# Patient Record
Sex: MALE | Race: WHITE | NOT HISPANIC OR LATINO | Employment: OTHER | URBAN - METROPOLITAN AREA
[De-identification: names, ages, dates, MRNs, and addresses within clinical notes are randomized per-mention and may not be internally consistent; named-entity substitution may affect disease eponyms.]

---

## 2020-02-06 ENCOUNTER — TRANSCRIBE ORDERS (OUTPATIENT)
Dept: LAB | Facility: HOSPITAL | Age: 70
End: 2020-02-06

## 2020-02-06 ENCOUNTER — TRANSCRIBE ORDERS (OUTPATIENT)
Dept: ADMINISTRATIVE | Facility: HOSPITAL | Age: 70
End: 2020-02-06

## 2020-02-06 ENCOUNTER — HOSPITAL ENCOUNTER (OUTPATIENT)
Dept: GENERAL RADIOLOGY | Facility: HOSPITAL | Age: 70
Discharge: HOME | End: 2020-02-06
Admitting: ORTHOPAEDIC SURGERY

## 2020-02-06 ENCOUNTER — APPOINTMENT (OUTPATIENT)
Dept: LAB | Facility: HOSPITAL | Age: 70
End: 2020-02-06

## 2020-02-06 DIAGNOSIS — Z87.898 PERSONAL HISTORY OF OTHER LYMPHATIC AND HEMATOPOIETIC NEOPLASM: Primary | ICD-10-CM

## 2020-02-06 DIAGNOSIS — Z01.818 OTHER SPECIFIED PRE-OPERATIVE EXAMINATION: ICD-10-CM

## 2020-02-06 DIAGNOSIS — Z01.811 PRE-OP CHEST EXAM: Primary | ICD-10-CM

## 2020-02-06 DIAGNOSIS — R73.09 IMPAIRED GLUCOSE TOLERANCE TEST: ICD-10-CM

## 2020-02-06 LAB
ANION GAP SERPL CALCULATED.3IONS-SCNC: 12.4 MMOL/L (ref 5–15)
BUN BLD-MCNC: 16 MG/DL (ref 8–23)
BUN/CREAT SERPL: 15 (ref 7–25)
CALCIUM SPEC-SCNC: 9.2 MG/DL (ref 8.6–10.5)
CHLORIDE SERPL-SCNC: 103 MMOL/L (ref 98–107)
CO2 SERPL-SCNC: 25.6 MMOL/L (ref 22–29)
CREAT BLD-MCNC: 1.07 MG/DL (ref 0.76–1.27)
DEPRECATED RDW RBC AUTO: 44.1 FL (ref 37–54)
ERYTHROCYTE [DISTWIDTH] IN BLOOD BY AUTOMATED COUNT: 13.1 % (ref 12.3–15.4)
GFR SERPL CREATININE-BSD FRML MDRD: 69 ML/MIN/1.73
GLUCOSE BLD-MCNC: 84 MG/DL (ref 65–99)
HBA1C MFR BLD: 5.76 % (ref 4.8–5.6)
HCT VFR BLD AUTO: 42.9 % (ref 37.5–51)
HGB BLD-MCNC: 14.7 G/DL (ref 13–17.7)
MCH RBC QN AUTO: 31.9 PG (ref 26.6–33)
MCHC RBC AUTO-ENTMCNC: 34.3 G/DL (ref 31.5–35.7)
MCV RBC AUTO: 93.1 FL (ref 79–97)
PLATELET # BLD AUTO: 223 10*3/MM3 (ref 140–450)
PMV BLD AUTO: 9.6 FL (ref 6–12)
POTASSIUM BLD-SCNC: 3.7 MMOL/L (ref 3.5–5.2)
RBC # BLD AUTO: 4.61 10*6/MM3 (ref 4.14–5.8)
SODIUM BLD-SCNC: 141 MMOL/L (ref 136–145)
WBC NRBC COR # BLD: 5.2 10*3/MM3 (ref 3.4–10.8)

## 2020-02-06 PROCEDURE — 93005 ELECTROCARDIOGRAM TRACING: CPT | Performed by: ORTHOPAEDIC SURGERY

## 2020-02-06 PROCEDURE — 80048 BASIC METABOLIC PNL TOTAL CA: CPT | Performed by: ORTHOPAEDIC SURGERY

## 2020-02-06 PROCEDURE — 36415 COLL VENOUS BLD VENIPUNCTURE: CPT | Performed by: ORTHOPAEDIC SURGERY

## 2020-02-06 PROCEDURE — 93010 ELECTROCARDIOGRAM REPORT: CPT | Performed by: INTERNAL MEDICINE

## 2020-02-06 PROCEDURE — 85027 COMPLETE CBC AUTOMATED: CPT | Performed by: ORTHOPAEDIC SURGERY

## 2020-02-06 PROCEDURE — 71046 X-RAY EXAM CHEST 2 VIEWS: CPT

## 2020-02-06 PROCEDURE — 83036 HEMOGLOBIN GLYCOSYLATED A1C: CPT | Performed by: ORTHOPAEDIC SURGERY

## 2020-12-18 ENCOUNTER — TRANSCRIBE ORDERS (OUTPATIENT)
Dept: ADMINISTRATIVE | Facility: HOSPITAL | Age: 70
End: 2020-12-18

## 2020-12-18 ENCOUNTER — APPOINTMENT (OUTPATIENT)
Dept: PREADMISSION TESTING | Facility: HOSPITAL | Age: 70
End: 2020-12-18

## 2020-12-18 DIAGNOSIS — R13.10 DYSPHAGIA, UNSPECIFIED TYPE: Primary | ICD-10-CM

## 2020-12-20 ENCOUNTER — APPOINTMENT (OUTPATIENT)
Dept: PREADMISSION TESTING | Facility: HOSPITAL | Age: 70
End: 2020-12-20

## 2020-12-20 LAB — SARS-COV-2 RNA RESP QL NAA+PROBE: NOT DETECTED

## 2020-12-20 PROCEDURE — U0004 COV-19 TEST NON-CDC HGH THRU: HCPCS

## 2020-12-20 PROCEDURE — C9803 HOPD COVID-19 SPEC COLLECT: HCPCS

## 2020-12-23 ENCOUNTER — HOSPITAL ENCOUNTER (OUTPATIENT)
Dept: GENERAL RADIOLOGY | Facility: HOSPITAL | Age: 70
Discharge: HOME | End: 2020-12-23
Admitting: OTOLARYNGOLOGY

## 2020-12-23 DIAGNOSIS — R13.10 DYSPHAGIA, UNSPECIFIED TYPE: ICD-10-CM

## 2020-12-23 PROCEDURE — A9270 NON-COVERED ITEM OR SERVICE: HCPCS | Performed by: OTOLARYNGOLOGY

## 2020-12-23 PROCEDURE — 74230 X-RAY XM SWLNG FUNCJ C+: CPT

## 2020-12-23 PROCEDURE — 63710000001 BARIUM SULFATE 40 % PASTE: Performed by: OTOLARYNGOLOGY

## 2020-12-23 PROCEDURE — 92611 MOTION FLUOROSCOPY/SWALLOW: CPT | Performed by: SPEECH-LANGUAGE PATHOLOGIST

## 2020-12-23 PROCEDURE — 63710000001 BARIUM SULFATE 40 % RECONSTITUTED SUSPENSION: Performed by: OTOLARYNGOLOGY

## 2020-12-23 RX ADMIN — BARIUM SULFATE 20 ML: 400 PASTE ORAL at 09:48

## 2020-12-23 RX ADMIN — BARIUM SULFATE 100 ML: 0.81 POWDER, FOR SUSPENSION ORAL at 09:48

## 2020-12-23 NOTE — MBS/VFSS/FEES
"Outpatient Speech Language Pathology   Adult Swallow Initial Evaluation  [facility]   Outpatient Modified Barium Swallow Study (MBS)       Patient Name: Shar Freitas  : 1950  MRN: [id number]  Today's Date: 2020         Visit Date: 2020   There is no problem list on file for this patient.       History reviewed. No pertinent past medical history.     No past surgical history on file.      Visit Dx:     ICD-10-CM ICD-9-CM   1. Dysphagia, unspecified type  R13.10 787.20           SLP Adult Swallow Evaluation     Row Name 20 0940       Rehab Evaluation    Document Type  evaluation  -    Subjective Information  no complaints  -CJ    Patient Observations  alert;cooperative  -CJ    Patient/Family/Caregiver Comments/Observations  no family present  -CJ    Care Plan Review  evaluation/treatment results reviewed;patient/other agree to care plan  -CJ    Patient Effort  excellent  -CJ    Symptoms Noted During/After Treatment  none  -CJ       General Information    Patient Profile Reviewed  yes  -CJ    Pertinent History Of Current Problem  Mr. Freitas is referred today by Dr. Freeman 2/2 complaints of difficulty swallowing and complaining of \"foods sticking\". He reports no recent hospitalizations, recent PNA, no significant neurological history. Pt reports he did have a heart stent placed in  at [facility]. He reports he previously took reflux medication but hasn't taken it in a while. He reports general hoarsness of vocal quality that has been \"going on for a few years\". He reports worsening dysphagia w/ solids. He denies any odynophagia.  -CJ    Current Method of Nutrition  regular textures;thin liquids  -CJ    Precautions/Limitations, Vision  WFL;for purposes of eval;WFL with corrective lenses  -CJ    Precautions/Limitations, Hearing  WFL;for purposes of eval  -CJ    Prior Level of Function-Communication  WFL  -CJ    Prior Level of Function-Swallowing  no diet consistency restrictions;safe, " efficient swallowing in all situations  -    Plans/Goals Discussed with  patient;agreed upon  -    Barriers to Rehab  none identified  -    Patient's Goals for Discharge  patient did not state  -       Pain    Additional Documentation  Pain Scale: FACES Pre/Post-Treatment (Group)  -       Pain Scale: FACES Pre/Post-Treatment    Pain: FACES Scale, Pretreatment  0-->no hurt  -CJ    Posttreatment Pain Rating  0-->no hurt  -CJ       Oral Motor Structure and Function    Dentition Assessment  natural, present and adequate  -    Secretion Management  WNL/WFL  -CJ    Mucosal Quality  moist, healthy  -    Volitional Swallow  WFL  -CJ    Volitional Cough  WFL  -CJ       Oral Musculature and Cranial Nerve Assessment    Oral Motor General Assessment  WFL  -CJ       General Eating/Swallowing Observations    Respiratory Support Currently in Use  room air  -    Eating/Swallowing Skills  self-fed  -    Positioning During Eating  upright 90 degree;upright in chair  -CJ    Utensils Used  spoon;straw;cup  -CJ       MBS/VFSS    Utensils Used  spoon;cup;straw  -    Consistencies Trialed  regular textures;pureed;thin liquids  -       MBS/VFSS Interpretation    Oral Prep Phase  WFL  -    Oral Transit Phase  WFL  -    Oral Residue  WFL  -    VFSS Summary  MBS completed this am. Mr. Freitas is positioned upright and centered in vess chair to accept po presentations of solid cracker, puree consistency and thin liquids via spoon cup and straw. His oral phase is wfl. Intermittent premature spillage w/ large consecutive sips of thin liquids via straw to the bilateral pyriforms. No laryngeal penetration or aspiration observed across this evaluation. No significant oral or pharyngeal residue evidenced across this evaluation. Noted mucosal abnormality at the level of C6 concerning for osteophyte vs CP however doese not impact function or safety of swallowing. Per this evaluation Mr. Freitas presents w/ wfl oropharyngeal  swallowing fnx. Educated Mr. Freitas on alternating liquids/solids, small bites/sips to reduce globus sensation. He verbalized agreement and understanding. He is felt to most benefit from continued regular diet consistency w/ thin liquids. Meds whole in puree/thins as tolerated. Upright and centered for all po intake. Small bites/sips. Alternate liquids/solids. No further SLP f/u warranted at this time.   -CJ       Initiation of Pharyngeal Swallow    Initiation of Pharyngeal Swallow  bolus in pyriform sinuses  -    Pharyngeal Phase  functional pharyngeal phase of swallowing  -CJ       Clinical Impression    SLP Swallowing Diagnosis  functional oral phase;functional pharyngeal phase  -    Functional Impact  no impact on function  -       Recommendations    Therapy Frequency (Swallow)  evaluation only  -    SLP Diet Recommendation  regular textures;thin liquids  -    Recommended Precautions and Strategies  upright posture during/after eating;small bites of food and sips of liquid;alternate between small bites of food and sips of liquid  -CJ    Oral Care Recommendations  Oral Care BID/PRN  -CJ    SLP Rec. for Method of Medication Administration  meds whole;with pudding or applesauce;with thin liquids;as tolerated  -CJ    Monitor for Signs of Aspiration  yes;notify SLP if any concerns  -CJ    Anticipated Discharge Disposition (SLP)  home  -      User Key  (r) = Recorded By, (t) = Taken By, (c) = Cosigned By    Initials Name Provider Type    Deisi Cordova MS CCC-SLP Speech and Language Pathologist            OP SLP Education     Row Name 12/23/20 2284       Education    Barriers to Learning  No barriers identified  -    Education Provided  Described results of evaluation;Patient expressed understanding of evaluation;Patient demonstrated recommended strategies  -    Assessed  Learning needs;Learning motivation;Learning preferences;Learning readiness  -    Learning Motivation  Strong  -CJ     Learning Method  Explanation;Other (comment) reviewed videos/images  -CJ    Teaching Response  Verbalized understanding;Demonstrated understanding  -      User Key  (r) = Recorded By, (t) = Taken By, (c) = Cosigned By    Initials Name Effective Dates    Deisi Cordova MS CCC-SLP 02/11/20 -               OP SLP Assessment/Plan - 12/23/20 1145        SLP Assessment    Functional Problems  Swallowing   -    Clinical Impression: Swallowing  WNL   -      User Key  (r) = Recorded By, (t) = Taken By, (c) = Cosigned By    Initials Name Provider Type    Deisi Cordova MS CCC-HAL Speech and Language Pathologist                    Time Calculation:        Therapy Charges for Today     Code Description Service Date Service Provider Modifiers Qty    [id number] HC ST MOTION FLUORO EVAL SWALLOW 5 12/23/2020 Deisi Helton MS CCC-SLP GN 1        Patient was not wearing a face mask and did not exhibit coughing during this therapy encounter.  Procedure performed was aerosolizing, involved close contact (within 6 feet for at least 15 minutes or longer), and did not involve contact with infectious secretions or specimens.  Therapist used appropriate personal protective equipment including gloves, standard procedure mask and eye protection.  Appropriate PPE was worn during the entire therapy session.  Hand hygiene was completed before and after therapy session.              MS MAXWELL Messer  12/23/2020